# Patient Record
Sex: FEMALE | Race: WHITE
[De-identification: names, ages, dates, MRNs, and addresses within clinical notes are randomized per-mention and may not be internally consistent; named-entity substitution may affect disease eponyms.]

---

## 2017-01-23 ENCOUNTER — HOSPITAL ENCOUNTER (OUTPATIENT)
Dept: HOSPITAL 22 - RAD | Age: 62
End: 2017-01-23
Attending: FAMILY MEDICINE
Payer: COMMERCIAL

## 2017-01-23 DIAGNOSIS — Z12.31: Primary | ICD-10-CM

## 2017-01-23 PROCEDURE — G0202 SCR MAMMO BI INCL CAD: HCPCS

## 2017-01-28 NOTE — RADIOLOGY REPORT PS360
DIG MAMM-SCREEN [place] W/CAD
CAD Screening 
 
COMPARISON:  Digital mammograms 1/19/2016 and 1/8/2015
 
INDICATION:  There is no personal or family history of breast cancer.
 
TECHNIQUE:  Standard CC and MLO images were obtained.  R2 CAD reviewed.  
 
FINDINGS:  Mild to moderate fibroglandular densities are seen in both breast.
There small benign-appearing calcifications in each breast and there is faint
arterial calcification left breast. There is no suspicious lesion and there are
no suspicious microcalcifications.
 
 
IMPRESSION: Fibrofatty parenchyma with no suspicious lesion seen recommend
yearly follow-up 
 
 
BI-RADS CATEGORY: 2_Benign
 
RECOMMENDED FOLLOWUP: 12M 12 MONTH FOLLOW-UP
 
(A letter has been sent to the patient regarding results of the study.)

## 2018-03-20 ENCOUNTER — HOSPITAL ENCOUNTER (OUTPATIENT)
Age: 63
End: 2018-03-20
Payer: COMMERCIAL

## 2018-03-20 DIAGNOSIS — Z12.31: Primary | ICD-10-CM

## 2018-03-20 PROCEDURE — 77067 SCR MAMMO BI INCL CAD: CPT

## 2019-03-22 ENCOUNTER — HOSPITAL ENCOUNTER (OUTPATIENT)
Age: 64
End: 2019-03-22
Payer: MEDICARE

## 2019-03-22 DIAGNOSIS — Z12.31: Primary | ICD-10-CM

## 2019-03-22 PROCEDURE — 77067 SCR MAMMO BI INCL CAD: CPT

## 2020-01-06 ENCOUNTER — HOSPITAL ENCOUNTER (OUTPATIENT)
Age: 65
End: 2020-01-06
Payer: MEDICARE

## 2020-01-06 DIAGNOSIS — R11.2: ICD-10-CM

## 2020-01-06 DIAGNOSIS — R10.13: Primary | ICD-10-CM

## 2020-01-06 PROCEDURE — 76705 ECHO EXAM OF ABDOMEN: CPT

## 2020-01-10 ENCOUNTER — HOSPITAL ENCOUNTER (OUTPATIENT)
Age: 65
End: 2020-01-10
Payer: MEDICARE

## 2020-01-10 DIAGNOSIS — R10.13: Primary | ICD-10-CM

## 2020-01-10 DIAGNOSIS — K59.00: ICD-10-CM

## 2020-01-10 DIAGNOSIS — R74.8: ICD-10-CM

## 2020-01-10 DIAGNOSIS — R11.2: ICD-10-CM

## 2020-01-10 PROCEDURE — 74177 CT ABD & PELVIS W/CONTRAST: CPT

## 2020-01-20 ENCOUNTER — ON CAMPUS - OUTPATIENT (OUTPATIENT)
Dept: RURAL HOSPITAL 6 | Facility: HOSPITAL | Age: 65
End: 2020-01-20

## 2020-01-20 DIAGNOSIS — K21.9 GASTRO-ESOPHAGEAL REFLUX DISEASE WITHOUT ESOPHAGITIS: ICD-10-CM

## 2020-01-20 DIAGNOSIS — R10.13 EPIGASTRIC PAIN: ICD-10-CM

## 2020-01-20 DIAGNOSIS — K30 FUNCTIONAL DYSPEPSIA: ICD-10-CM

## 2020-01-20 DIAGNOSIS — K29.50 UNSPECIFIED CHRONIC GASTRITIS WITHOUT BLEEDING: ICD-10-CM

## 2020-01-20 PROCEDURE — 43239 EGD BIOPSY SINGLE/MULTIPLE: CPT | Performed by: INTERNAL MEDICINE

## 2020-02-07 ENCOUNTER — TRANSCRIBE ORDERS (OUTPATIENT)
Dept: NUTRITION | Facility: HOSPITAL | Age: 65
End: 2020-02-07

## 2020-02-07 DIAGNOSIS — E74.31 SUCRASE-ISOMALTASE DEFICIENCY: Primary | ICD-10-CM

## 2020-02-27 ENCOUNTER — HOSPITAL ENCOUNTER (OUTPATIENT)
Dept: NUTRITION | Facility: HOSPITAL | Age: 65
Setting detail: RECURRING SERIES
Discharge: HOME | End: 2020-02-27

## 2020-02-27 VITALS — HEIGHT: 62 IN | BODY MASS INDEX: 23.05 KG/M2

## 2020-02-27 PROCEDURE — 97802 MEDICAL NUTRITION INDIV IN: CPT

## 2020-03-02 ENCOUNTER — ON CAMPUS - OUTPATIENT (OUTPATIENT)
Dept: RURAL HOSPITAL 6 | Facility: HOSPITAL | Age: 65
End: 2020-03-02
Payer: MEDICARE

## 2020-03-02 DIAGNOSIS — Z12.11 ENCOUNTER FOR SCREENING FOR MALIGNANT NEOPLASM OF COLON: ICD-10-CM

## 2020-03-02 DIAGNOSIS — K57.90 DIVERTICULOSIS OF INTESTINE, PART UNSPECIFIED, WITHOUT PERFO: ICD-10-CM

## 2020-03-02 PROCEDURE — G0121 COLON CA SCRN NOT HI RSK IND: HCPCS | Performed by: INTERNAL MEDICINE

## 2020-03-16 ENCOUNTER — TELEPHONE (OUTPATIENT)
Dept: NUTRITION | Facility: HOSPITAL | Age: 65
End: 2020-03-16

## 2020-03-16 NOTE — PROGRESS NOTES
Patient calls to cancel nutrition follow up with RD for 3/17 related to CSID. States she noticed the biggest improvement with adding Miralax and Citrucel, rather than with the sucrose elimination diet and Sucraid. States after discussing with provider, she is going to discontinue Sucraid and questions continuing the low sucrose diet. Because patient is feeling better unrelated to a low sucrose diet, RD encouraged patient to only reference back to the diet as needed. Patient still complains of burping. RD suggested no avoiding caffeine, carbonation, acidic and spicy foods, and laying down after meals - patient states she has tried all of these without success. States she is okay with the burping because she has found relief for her more concerning symptoms, the gassiness and bloating. Patient has no questions or concerns for RD at this time.     Goal completion:  1. Follow low sucrose elimination diet: 100%   2. Maintain or gain weight: 100%    Total of 15 minutes spent for telephone follow up with patient. She is encouraged to call as needed. Thank you again for this referral.

## 2020-03-17 ENCOUNTER — APPOINTMENT (OUTPATIENT)
Dept: NUTRITION | Facility: HOSPITAL | Age: 65
End: 2020-03-17

## 2020-07-28 ENCOUNTER — HOSPITAL ENCOUNTER (OUTPATIENT)
Age: 65
End: 2020-07-28
Payer: MEDICARE

## 2020-07-28 DIAGNOSIS — M53.3: Primary | ICD-10-CM

## 2020-07-28 PROCEDURE — 72220 X-RAY EXAM SACRUM TAILBONE: CPT

## 2020-07-31 ENCOUNTER — HOSPITAL ENCOUNTER (OUTPATIENT)
Age: 65
End: 2020-07-31
Payer: MEDICARE

## 2020-07-31 DIAGNOSIS — Z12.31: Primary | ICD-10-CM

## 2020-07-31 PROCEDURE — 77067 SCR MAMMO BI INCL CAD: CPT

## 2020-07-31 PROCEDURE — 77063 BREAST TOMOSYNTHESIS BI: CPT

## 2021-04-20 ENCOUNTER — OFFICE (OUTPATIENT)
Dept: URBAN - METROPOLITAN AREA CLINIC 4 | Facility: CLINIC | Age: 66
End: 2021-04-20

## 2021-04-20 DIAGNOSIS — K30 FUNCTIONAL DYSPEPSIA: ICD-10-CM

## 2021-04-20 DIAGNOSIS — R11.2 NAUSEA WITH VOMITING, UNSPECIFIED: ICD-10-CM

## 2021-04-20 DIAGNOSIS — R14.0 ABDOMINAL DISTENSION (GASEOUS): ICD-10-CM

## 2021-04-20 DIAGNOSIS — K59.00 CONSTIPATION, UNSPECIFIED: ICD-10-CM

## 2021-04-20 DIAGNOSIS — E74.31 SUCRASE-ISOMALTASE DEFICIENCY: ICD-10-CM

## 2021-04-20 PROCEDURE — 99213 OFFICE O/P EST LOW 20 MIN: CPT | Mod: 95 | Performed by: INTERNAL MEDICINE

## 2021-05-19 ENCOUNTER — HOSPITAL ENCOUNTER (OUTPATIENT)
Age: 66
End: 2021-05-19
Payer: MEDICARE

## 2021-05-19 DIAGNOSIS — T78.1XXA: Primary | ICD-10-CM

## 2021-05-20 ENCOUNTER — HOSPITAL ENCOUNTER (OUTPATIENT)
Age: 66
End: 2021-05-20
Payer: MEDICARE

## 2021-05-20 DIAGNOSIS — Z91.018: Primary | ICD-10-CM

## 2021-05-20 PROCEDURE — 36415 COLL VENOUS BLD VENIPUNCTURE: CPT

## 2021-05-20 PROCEDURE — 86003 ALLG SPEC IGE CRUDE XTRC EA: CPT

## 2021-08-18 ENCOUNTER — HOSPITAL ENCOUNTER (OUTPATIENT)
Age: 66
End: 2021-08-18
Payer: MEDICARE

## 2021-08-18 DIAGNOSIS — Z12.31: Primary | ICD-10-CM

## 2021-08-18 PROCEDURE — 77067 SCR MAMMO BI INCL CAD: CPT

## 2021-08-18 PROCEDURE — 77063 BREAST TOMOSYNTHESIS BI: CPT

## 2021-12-06 ENCOUNTER — HOSPITAL ENCOUNTER (OUTPATIENT)
Age: 66
End: 2021-12-06
Payer: MEDICARE

## 2021-12-06 DIAGNOSIS — Z13.820: Primary | ICD-10-CM

## 2021-12-06 DIAGNOSIS — Z78.0: ICD-10-CM

## 2021-12-06 PROCEDURE — 77080 DXA BONE DENSITY AXIAL: CPT

## 2022-09-16 ENCOUNTER — HOSPITAL ENCOUNTER (OUTPATIENT)
Age: 67
End: 2022-09-16
Payer: MEDICARE

## 2022-09-16 DIAGNOSIS — Z12.31: Primary | ICD-10-CM

## 2022-09-16 PROCEDURE — 77067 SCR MAMMO BI INCL CAD: CPT

## 2022-09-16 PROCEDURE — 77063 BREAST TOMOSYNTHESIS BI: CPT

## 2022-09-27 ENCOUNTER — HOSPITAL ENCOUNTER (OUTPATIENT)
Age: 67
End: 2022-09-27
Payer: MEDICARE

## 2022-09-27 DIAGNOSIS — R92.8: Primary | ICD-10-CM

## 2022-09-27 PROCEDURE — G0279 TOMOSYNTHESIS, MAMMO: HCPCS

## 2022-09-27 PROCEDURE — 76641 ULTRASOUND BREAST COMPLETE: CPT

## 2022-09-27 PROCEDURE — 77065 DX MAMMO INCL CAD UNI: CPT

## 2022-09-27 PROCEDURE — 77061 BREAST TOMOSYNTHESIS UNI: CPT

## 2023-04-05 ENCOUNTER — HOSPITAL ENCOUNTER (OUTPATIENT)
Age: 68
End: 2023-04-05
Payer: MEDICARE

## 2023-04-05 DIAGNOSIS — R92.8: Primary | ICD-10-CM

## 2023-04-05 PROCEDURE — 76641 ULTRASOUND BREAST COMPLETE: CPT

## 2023-09-18 ENCOUNTER — HOSPITAL ENCOUNTER (OUTPATIENT)
Age: 68
End: 2023-09-18
Payer: MEDICARE

## 2023-09-18 DIAGNOSIS — R92.8: Primary | ICD-10-CM

## 2023-09-18 PROCEDURE — 76641 ULTRASOUND BREAST COMPLETE: CPT

## 2023-12-21 ENCOUNTER — HOSPITAL ENCOUNTER (OUTPATIENT)
Dept: HOSPITAL 22 - RAD | Age: 68
End: 2023-12-21
Payer: MEDICARE

## 2023-12-21 DIAGNOSIS — Z13.820: ICD-10-CM

## 2023-12-21 DIAGNOSIS — Z78.0: ICD-10-CM

## 2023-12-21 PROCEDURE — 77080 DXA BONE DENSITY AXIAL: CPT

## 2023-12-21 NOTE — XR_ITS
FINAL REPORT 
 
CLINICAL HISTORY: 
OSTEO SCREENING 
 
COMPARISON: 
12/6/2021 
 
FINDINGS: 
Using L1-4, the bone mineral density of the spine is 0.986 
g/cm2, corresponding to T-score of -0.6, within normal limits. 
Previously 0.965 g/cm? with a T-score of -0.7.  Using the left 
hip, the bone mineral density of the femoral neck is 0.647 
g/cm2, corresponding to a T-score of -1.8, consistent with 
osteopenia.  Previously 0.699 g/cm? with T-score of -1.4.  Using 
the right hip, the bone mineral density of the femoral neck is 
0.647 g/cm2, corresponding to a T-score of -1.8, consistent with 
osteopenia.  Previously 0.665 g/cm? with T-score of -1.7.  FRAX 
10 year fracture risk is 2.8% for a hip fracture and 15% for a 
major osteoporotic fracture.  NOTE:   T-score:  Standard 
deviation compared with peak bone mass of young adult mean. 
*Following the recommendations of the International Society of 
Bone densitometry, classification of hip BMD is based on the 
lower of two T-scores; total hip or femoral neck. 
 
IMPRESSION: 
Diminished bone mineral density consistent with osteopenia. 
 
Reviewed, Interpreted and Dictated by Marshall Mariano MD 
Transcribed by Muna Porras 
 
Authenticated and Electronically Signed by Marshall Mariano MD 
on 12/21/2023 11:47:10 AM [facility]

## 2025-01-23 ENCOUNTER — HOSPITAL ENCOUNTER (OUTPATIENT)
Dept: HOSPITAL 22 - RAD | Age: 70
Discharge: HOME | End: 2025-01-23
Payer: MEDICARE

## 2025-01-23 DIAGNOSIS — Z12.31: Primary | ICD-10-CM

## 2025-01-23 PROCEDURE — 77063 BREAST TOMOSYNTHESIS BI: CPT

## 2025-01-23 PROCEDURE — 77067 SCR MAMMO BI INCL CAD: CPT

## 2025-01-23 NOTE — MM_ITS
PROCEDURE INFORMATION:  
Exam: MG Bilateral Screening 3D Mammography  
Exam date and time: 1/23/2025 9:45 AM  
Age: 69 years old  
Clinical indication: Screening examination  
 
TECHNIQUE:  
Imaging protocol: Bilateral Screening tomosynthesis and 2D  
mammography  
including computer-aided detection (CAD) when performed.  
 
COMPARISON:  
1.   MG MM DIG MAMM DX UNILAT RT CAD 9/27/2022 12:57 PM  
2.   MG MM DIG SCREENING MAMM BI W/CAD 9/16/2022 10:22 AM  
 
FINDINGS:  
 
MAMMOGRAPHY:  
Breast composition: There are scattered areas of fibroglandular  
density.  
Mass: None.  
Architectural distortion: None.  
Calcifications: No suspicious calcifications.  
Asymmetric density: None.  
Skin thickening: None.  
Axillary adenopathy: None.  
 
IMPRESSION:  
No mammographic evidence of malignancy. Annual screening is  
recommended unless  
otherwise clinically indicated.  
 
ASSESSMENT:  
BI-RADS Category 1: Negative.  
 
Electronically signed by Crystal Yuan MD at 01/27/2025 08:07

## 2025-05-02 ENCOUNTER — HOSPITAL ENCOUNTER (OUTPATIENT)
Dept: HOSPITAL 22 - RT | Age: 70
Discharge: HOME | End: 2025-05-02
Payer: MEDICARE

## 2025-05-02 DIAGNOSIS — R53.83: ICD-10-CM

## 2025-05-02 DIAGNOSIS — R06.02: ICD-10-CM

## 2025-05-02 DIAGNOSIS — I50.20: Primary | ICD-10-CM

## 2025-05-02 DIAGNOSIS — R60.0: ICD-10-CM

## 2025-05-02 PROCEDURE — 93306 TTE W/DOPPLER COMPLETE: CPT
